# Patient Record
Sex: FEMALE | URBAN - METROPOLITAN AREA
[De-identification: names, ages, dates, MRNs, and addresses within clinical notes are randomized per-mention and may not be internally consistent; named-entity substitution may affect disease eponyms.]

---

## 2019-08-21 ENCOUNTER — OFFICE VISIT (OUTPATIENT)
Dept: SURGERY | Facility: CLINIC | Age: 69
End: 2019-08-21
Payer: MEDICARE

## 2019-08-21 VITALS
WEIGHT: 99.63 LBS | HEIGHT: 60 IN | SYSTOLIC BLOOD PRESSURE: 137 MMHG | BODY MASS INDEX: 19.56 KG/M2 | DIASTOLIC BLOOD PRESSURE: 85 MMHG

## 2019-08-21 DIAGNOSIS — E04.1 SOLITARY NODULE OF RIGHT LOBE OF THYROID: Primary | ICD-10-CM

## 2019-08-21 DIAGNOSIS — E04.1 THYROID NODULE: ICD-10-CM

## 2019-08-21 PROCEDURE — 99203 OFFICE O/P NEW LOW 30 MIN: CPT | Mod: S$GLB,,, | Performed by: SURGERY

## 2019-08-21 PROCEDURE — 99203 PR OFFICE/OUTPT VISIT, NEW, LEVL III, 30-44 MIN: ICD-10-PCS | Mod: S$GLB,,, | Performed by: SURGERY

## 2019-08-21 RX ORDER — BUTALBITAL, ACETAMINOPHEN AND CAFFEINE 50; 325; 40 MG/1; MG/1; MG/1
1 TABLET ORAL EVERY 4 HOURS PRN
COMMUNITY

## 2019-08-21 RX ORDER — IBUPROFEN 600 MG/1
600 TABLET ORAL EVERY 6 HOURS PRN
COMMUNITY

## 2019-08-21 RX ORDER — SIMVASTATIN 40 MG/1
40 TABLET, FILM COATED ORAL NIGHTLY
COMMUNITY

## 2019-08-21 RX ORDER — ALPRAZOLAM 0.5 MG/1
TABLET ORAL
Refills: 1 | COMMUNITY
Start: 2019-08-13

## 2019-08-21 RX ORDER — PROPRANOLOL HYDROCHLORIDE 10 MG/1
10 TABLET ORAL 3 TIMES DAILY
COMMUNITY

## 2019-08-21 NOTE — PROGRESS NOTES
History & Physical    SUBJECTIVE:     History of Present Illness:   69-year-old female referred by Dr. Gabrilea Cheung for evaluation treatment of a dominant right thyroid nodule.  I had seen her back in 2013 for this reason and she had a FNA of the thyroid nodule which showed a follicular lesion.  Patient states that she has noted increased size of the dominant right thyroid nodule over the last several years.  She is having no dysphagia or hoarseness.  Patient states that her thyroid functions have been normal. She is most concerned about the increased size and he noticability of the nodule because of its size in anterior location.      Chief Complaint   Patient presents with    Thyroid Problem         Review of patient's allergies indicates:  Review of patient's allergies indicates:  No Known Allergies    Current Outpatient Medications on File Prior to Visit   Medication Sig Dispense Refill    ALPRAZolam (XANAX) 0.5 MG tablet   1    butalbital-acetaminophen-caffeine -40 mg (FIORICET, ESGIC) -40 mg per tablet Take 1 tablet by mouth every 4 (four) hours as needed for Pain.      ibuprofen (ADVIL,MOTRIN) 600 MG tablet Take 600 mg by mouth every 6 (six) hours as needed for Pain.      propranolol (INDERAL) 10 MG tablet Take 10 mg by mouth 3 (three) times daily.      simvastatin (ZOCOR) 40 MG tablet Take 40 mg by mouth every evening.       No current facility-administered medications on file prior to visit.        Past Medical History:   Diagnosis Date    Anxiety     Bulging of cervical intervertebral disc     Headache     Hyperlipidemia     Tremors of nervous system     Tuberculosis      Past Surgical History:   Procedure Laterality Date    ADENOIDECTOMY      CARPAL TUNNEL RELEASE Right     TONSILLECTOMY      TRIGGER FINGER RELEASE       History reviewed. No pertinent family history.    Social History     Socioeconomic History    Marital status: Unknown     Spouse name: Not on file     Number of children: Not on file    Years of education: Not on file    Highest education level: Not on file   Occupational History    Not on file   Social Needs    Financial resource strain: Not on file    Food insecurity:     Worry: Not on file     Inability: Not on file    Transportation needs:     Medical: Not on file     Non-medical: Not on file   Tobacco Use    Smoking status: Never Smoker   Substance and Sexual Activity    Alcohol use: Not on file    Drug use: Not on file    Sexual activity: Not on file   Lifestyle    Physical activity:     Days per week: Not on file     Minutes per session: Not on file    Stress: Not on file   Relationships    Social connections:     Talks on phone: Not on file     Gets together: Not on file     Attends Latter day service: Not on file     Active member of club or organization: Not on file     Attends meetings of clubs or organizations: Not on file     Relationship status: Not on file   Other Topics Concern    Not on file   Social History Narrative    Not on file          Review of Systems   Constitutional: Negative for chills, fever and weight loss.   Respiratory: Negative for cough, sputum production and shortness of breath.    Cardiovascular: Negative for chest pain and palpitations.   Gastrointestinal: Negative for abdominal pain, heartburn, nausea and vomiting.   Genitourinary: Negative for dysuria and urgency.   Musculoskeletal: Negative for myalgias.   Skin: Negative for itching and rash.   Neurological: Negative for dizziness, seizures and weakness.   Endo/Heme/Allergies: Does not bruise/bleed easily.       OBJECTIVE:     Vitals:    08/21/19 1327   BP: 137/85                 Physical Exam:  Physical Exam   Constitutional: She is oriented to person, place, and time and well-developed, well-nourished, and in no distress.   HENT:   Head: Normocephalic and atraumatic.   Eyes: Pupils are equal, round, and reactive to light. EOM are normal.   Neck: Normal range  of motion. Neck supple. No tracheal deviation present.   Approximately 2.5 cm round right thyroid nodule.  Left thyroid gland feels normal. No cervical adenopathy is appreciated   Cardiovascular: Normal rate, regular rhythm and normal heart sounds.   Pulmonary/Chest: Effort normal and breath sounds normal. No respiratory distress.   Abdominal: Soft. Bowel sounds are normal. She exhibits no distension. There is no tenderness.   Musculoskeletal: Normal range of motion. She exhibits no edema, tenderness or deformity.   Lymphadenopathy:     She has no cervical adenopathy.   Neurological: She is alert and oriented to person, place, and time. She has normal reflexes.   Skin: Skin is warm and dry. No erythema.   Psychiatric: Affect and judgment normal.           ASSESSMENT/PLAN:   2.5 cm right thyroid nodule, probable follicular nodule  PLAN:  Discuss treatment options including observation versus right thyroid lobectomy.  Patient desires excision of the nodule due to its increase in size.  Will get a updated thyroid ultrasound but I see no reason to repeat FNA since lesion is likely benign and will be removed. Surgery scheduled for September 3, 2019.  Procedure, risk and benefits and expected postoperative course were discussed with the patient.

## 2019-08-21 NOTE — LETTER
August 21, 2019      Gabriela Cheung MD  [address]  [place] [address]           [facility]  [address] [facility] [place] [address]  Phone: [phone]  Fax: [phone]          Patient: Dena Rutledge   MR Number: [id number]   YOB: 1950   Date of Visit: 8/21/2019       Dear Dr. Gabriela Cheung:    Thank you for referring Dena Rutledge to me for evaluation. Attached you will find relevant portions of my assessment and plan of care.    If you have questions, please do not hesitate to call me. I look forward to following Dena Rutledge along with you.    Sincerely,    Brian Escobar MD    Enclosure  CC:  No Recipients    If you would like to receive this communication electronically, please contact [email or web address] or [phone] to request more information on "[employer] Link access.    For providers and/or their staff who would like to refer a patient to [facility], please contact us through our one-stop-shop provider referral line, [facility], at [phone].    If you feel you have received this communication in error or would no longer like to receive these types of communications, please e-mail [email or web address]

## 2019-08-27 LAB
BASOPHILS NFR SNV MANUAL: 0.6 % (ref 0–3)
BUN SERPL-MCNC: 11 MG/DL (ref 7–18)
BUN/CREAT SERPL: 17.46 RATIO (ref 7–18)
CALCIUM SERPL-MCNC: 9.9 MG/DL (ref 8.8–10.5)
CHLORIDE SERPL-SCNC: 106 MMOL/L (ref 100–108)
CO2 SERPL-SCNC: 31 MMOL/L (ref 21–32)
CREAT SERPL-MCNC: 0.63 MG/DL (ref 0.55–1.02)
EOSINOPHIL NFR SNV MANUAL: 1 % (ref 1–3)
ERYTHROCYTE [DISTWIDTH] IN BLOOD BY AUTOMATED COUNT: 14 % (ref 12.5–18)
GFR ESTIMATION: > 60
GLUCOSE SERPL-MCNC: 94 MG/DL (ref 70–110)
HCT VFR BLD AUTO: 40.7 % (ref 37–47)
HGB BLD-MCNC: 13.6 G/DL (ref 12–16)
LYMPHOCYTES NFR SNV MANUAL: 26.5 % (ref 25–40)
MANUAL NRBC PER 100 CELLS: 0 %
MCH RBC QN AUTO: 28.3 PG (ref 27–31.2)
MCHC RBC AUTO-ENTMCNC: 33.4 G/DL (ref 31.8–35.4)
MCV RBC AUTO: 84.6 FL (ref 80–97)
MONOCYTES/100 LEUKOCYTES: 6.8 % (ref 1–15)
NEUTROPHILS NFR BLD: 4 10*3/UL (ref 1.8–7.7)
NEUTROPHILS NFR SNV MANUAL: 64.9 % (ref 37–80)
PLATELETS: 249 10*3/UL (ref 142–424)
POTASSIUM SERPL-SCNC: 3.8 MMOL/L (ref 3.6–5.2)
RBC # BLD AUTO: 4.81 10*6/UL (ref 4.2–5.4)
SODIUM BLD-SCNC: 142 MMOL/L (ref 135–145)
WBC # BLD: 6.2 10*3/UL (ref 4.6–10.2)

## 2019-09-11 ENCOUNTER — OFFICE VISIT (OUTPATIENT)
Dept: SURGERY | Facility: CLINIC | Age: 69
End: 2019-09-11
Payer: MEDICARE

## 2019-09-11 DIAGNOSIS — Z98.890 POST-OPERATIVE STATE: Primary | ICD-10-CM

## 2019-09-11 PROCEDURE — 99024 PR POST-OP FOLLOW-UP VISIT: ICD-10-PCS | Mod: S$GLB,POP,, | Performed by: SURGERY

## 2019-09-11 PROCEDURE — 99024 POSTOP FOLLOW-UP VISIT: CPT | Mod: S$GLB,POP,, | Performed by: SURGERY

## 2019-09-11 NOTE — PROGRESS NOTES
HPI:  Postoperative visit status post right thyroid lobectomy.  Doing well. No hoarseness or dysphagia.  Path report shows benign nodular hyperplasia    PHYSICAL EXAM:  Incision clean and dry with no significant swelling.  Subcuticular sutures is removed and Steri-Strips remain intact  ASSESSMENT:   Stable status post right thyroid lobectomy  PLAN:     Recommend having thyroid function tests checked and 4-6 weeks.  Revisit as needed